# Patient Record
Sex: FEMALE | Race: WHITE
[De-identification: names, ages, dates, MRNs, and addresses within clinical notes are randomized per-mention and may not be internally consistent; named-entity substitution may affect disease eponyms.]

---

## 2022-07-31 ENCOUNTER — HOSPITAL ENCOUNTER (EMERGENCY)
Dept: HOSPITAL 75 - ER FS | Age: 37
Discharge: HOME | End: 2022-07-31
Payer: COMMERCIAL

## 2022-07-31 VITALS — DIASTOLIC BLOOD PRESSURE: 93 MMHG | SYSTOLIC BLOOD PRESSURE: 141 MMHG

## 2022-07-31 DIAGNOSIS — Z28.310: ICD-10-CM

## 2022-07-31 DIAGNOSIS — K81.0: Primary | ICD-10-CM

## 2022-07-31 LAB
ALBUMIN SERPL-MCNC: 4 GM/DL (ref 3.2–4.5)
ALP SERPL-CCNC: 52 U/L (ref 40–136)
ALT SERPL-CCNC: 22 U/L (ref 0–55)
AMYLASE SERPL-CCNC: 51 U/L (ref 25–125)
APTT PPP: YELLOW S
BACTERIA #/AREA URNS HPF: (no result) /HPF
BASOPHILS # BLD AUTO: 0.1 10^3/UL (ref 0–0.1)
BASOPHILS NFR BLD AUTO: 1 % (ref 0–10)
BILIRUB SERPL-MCNC: < 0.2 MG/DL (ref 0.1–1)
BILIRUB UR QL STRIP: NEGATIVE
BUN/CREAT SERPL: 16
CALCIUM SERPL-MCNC: 9.2 MG/DL (ref 8.5–10.1)
CHLORIDE SERPL-SCNC: 105 MMOL/L (ref 98–107)
CO2 SERPL-SCNC: 27 MMOL/L (ref 21–32)
CREAT SERPL-MCNC: 1.05 MG/DL (ref 0.6–1.3)
EOSINOPHIL # BLD AUTO: 0.3 10^3/UL (ref 0–0.3)
EOSINOPHIL NFR BLD AUTO: 2 % (ref 0–10)
EOSINOPHIL NFR BLD MANUAL: 3 %
FIBRINOGEN PPP-MCNC: CLEAR MG/DL
GFR SERPLBLD BASED ON 1.73 SQ M-ARVRAT: 70 ML/MIN
GLUCOSE SERPL-MCNC: 107 MG/DL (ref 70–105)
GLUCOSE UR STRIP-MCNC: NEGATIVE MG/DL
HCT VFR BLD CALC: 43 % (ref 35–52)
HGB BLD-MCNC: 14 G/DL (ref 11.5–16)
KETONES UR QL STRIP: NEGATIVE
LEUKOCYTE ESTERASE UR QL STRIP: NEGATIVE
LIPASE SERPL-CCNC: 29 U/L (ref 8–78)
LYMPHOCYTES # BLD AUTO: 4.2 X 10^3 (ref 1–4)
LYMPHOCYTES NFR BLD AUTO: 28 % (ref 12–44)
MANUAL DIFFERENTIAL PERFORMED BLD QL: YES
MCH RBC QN AUTO: 30 PG (ref 25–34)
MCHC RBC AUTO-ENTMCNC: 33 G/DL (ref 32–36)
MCV RBC AUTO: 92 FL (ref 80–99)
MONOCYTES # BLD AUTO: 1 X 10^3 (ref 0–1)
MONOCYTES NFR BLD AUTO: 7 % (ref 0–12)
MONOCYTES NFR BLD: 2 %
NEUTROPHILS # BLD AUTO: 9.4 X 10^3 (ref 1.8–7.8)
NEUTROPHILS NFR BLD AUTO: 63 % (ref 42–75)
NEUTS BAND NFR BLD MANUAL: 65 %
NITRITE UR QL STRIP: NEGATIVE
PH UR STRIP: 5 [PH] (ref 5–9)
PLATELET # BLD: 347 10^3/UL (ref 130–400)
PMV BLD AUTO: 10.1 FL (ref 9–12.2)
POTASSIUM SERPL-SCNC: 3.9 MMOL/L (ref 3.6–5)
PROT SERPL-MCNC: 7.4 GM/DL (ref 6.4–8.2)
PROT UR QL STRIP: NEGATIVE
RBC #/AREA URNS HPF: (no result) /HPF
SODIUM SERPL-SCNC: 142 MMOL/L (ref 135–145)
SP GR UR STRIP: >=1.03 (ref 1.02–1.02)
SQUAMOUS #/AREA URNS HPF: (no result) /HPF
VARIANT LYMPHS NFR BLD MANUAL: 30 %
WBC # BLD AUTO: 15 10^3/UL (ref 4.3–11)
WBC #/AREA URNS HPF: (no result) /HPF

## 2022-07-31 PROCEDURE — 36415 COLL VENOUS BLD VENIPUNCTURE: CPT

## 2022-07-31 PROCEDURE — 84703 CHORIONIC GONADOTROPIN ASSAY: CPT

## 2022-07-31 PROCEDURE — 81000 URINALYSIS NONAUTO W/SCOPE: CPT

## 2022-07-31 PROCEDURE — 85007 BL SMEAR W/DIFF WBC COUNT: CPT

## 2022-07-31 PROCEDURE — 80053 COMPREHEN METABOLIC PANEL: CPT

## 2022-07-31 PROCEDURE — 74177 CT ABD & PELVIS W/CONTRAST: CPT

## 2022-07-31 PROCEDURE — 83690 ASSAY OF LIPASE: CPT

## 2022-07-31 PROCEDURE — 85027 COMPLETE CBC AUTOMATED: CPT

## 2022-07-31 PROCEDURE — 93005 ELECTROCARDIOGRAM TRACING: CPT

## 2022-07-31 PROCEDURE — 82150 ASSAY OF AMYLASE: CPT

## 2022-07-31 NOTE — ED ABDOMINAL PAIN
General


Chief Complaint:  Abdominal/GI Problems


Stated Complaint:  UPPER ABDOMINAL PAIN


Nursing Triage Note:  


Pt complaining of epigastric pain that started about 3 hours pta


Source of Information:  Patient





History of Present Illness


Date Seen by Provider:  Jul 31, 2022


Time Seen by Provider:  03:27


Initial Comments


36-year-old female with no previous major medical issues presents to ER with 

complaints of epigastric abdominal pain that kind of radiates down and also 

feeling in the back.  Started about 1130 last night.  Patient ate last around 7 

PM last night.  Patient is never had pain like this before.  Patient states is 

not really pain is much as discomfort.  No nausea no vomiting.  Is has had it 

sometimes but then it would go away.  This time is not going away.  Will just 

have kind of a cold sweat.  Pain is going into the back or discomfort is going 

into the back.  She had a shot for her sciatic nerve pain previously.  No change

in bowel movement no change in urination





Allergies and Home Medications


Allergies


Coded Allergies:  


     No Known Drug Allergies (Unverified , 7/31/22)





Patient Home Medication List


Home Medication List Reviewed:  Yes





Review of Systems


Review of Systems


Constitutional:  see HPI





Past Medical-Social-Family Hx


Patient Social History


Tobacco Use?:  No


Use of E-Cig and/or Vaping dev:  No


Substance use?:  No


Alcohol Use?:  No


Pt feels they are or have been:  No





Physical Exam


Vital Signs





Vital Signs - First Documented








 7/31/22





 03:20


 


Temp 36.4


 


Pulse 70


 


Resp 18


 


B/P (MAP) 147/88 (107)


 


Pulse Ox 99


 


O2 Delivery Room Air





Capillary Refill : Less Than 3 Seconds


Height/Weight/BMI


Height: '"


Weight: lbs. oz. kg;  BMI


Method:


General Appearance:  WD/WN, mild distress


HEENT:  PERRL/EOMI, normal ENT inspection, pharynx normal


Neck:  non-tender, supple


Respiratory:  lungs clear, normal breath sounds, no respiratory distress


Cardiovascular:  regular rate, rhythm, no edema, no murmur


Gastrointestinal:  non tender, soft, abnormal bowel sounds; No guarding, No 

rebound, No tenderness


Extremities:  normal range of motion, non-tender, no pedal edema


Neurologic/Psychiatric:  alert, oriented x 3


Skin:  normal color, warm/dry





Progress/Results/Core Measures


Results/Orders


Lab Results





Laboratory Tests








Test


 7/31/22


03:25 7/31/22


03:50 Range/Units


 


 


Urine Color YELLOW    


 


Urine Clarity CLEAR    


 


Urine pH 5.0   5-9  


 


Urine Specific Gravity >=1.030   1.016-1.022  


 


Urine Protein NEGATIVE   NEGATIVE  


 


Urine Glucose (UA) NEGATIVE   NEGATIVE  


 


Urine Ketones NEGATIVE   NEGATIVE  


 


Urine Nitrite NEGATIVE   NEGATIVE  


 


Urine Bilirubin NEGATIVE   NEGATIVE  


 


Urine Urobilinogen 0.2   < = 1.0  MG/DL


 


Urine Leukocyte Esterase NEGATIVE   NEGATIVE  


 


Urine RBC (Auto) 1+ H  NEGATIVE  


 


Urine RBC 2-5 H   /HPF


 


Urine WBC NONE    /HPF


 


Urine Squamous Epithelial


Cells 25-50 H


 


  /HPF





 


Urine Crystals NONE    /LPF


 


Urine Bacteria TRACE    /HPF


 


Urine Casts NONE    /LPF


 


Urine Mucus NEGATIVE    /LPF


 


Urine Culture Indicated NO    


 


White Blood Count


 


 15.0 H


 4.3-11.0


10^3/uL


 


Red Blood Count


 


 4.70 


 3.80-5.11


10^6/uL


 


Hemoglobin  14.0  11.5-16.0  g/dL


 


Hematocrit  43  35-52  %


 


Mean Corpuscular Volume  92  80-99  fL


 


Mean Corpuscular Hemoglobin  30  25-34  pg


 


Mean Corpuscular Hemoglobin


Concent 


 33 


 32-36  g/dL





 


Red Cell Distribution Width  13.4  10.0-14.5  %


 


Platelet Count


 


 347 


 130-400


10^3/uL


 


Mean Platelet Volume  10.1  9.0-12.2  fL


 


Neutrophils (%) (Auto)  63  42-75  %


 


Lymphocytes (%) (Auto)  28  12-44  %


 


Monocytes (%) (Auto)  7  0-12  %


 


Eosinophils (%) (Auto)  2  0-10  %


 


Basophils (%) (Auto)  1  0-10  %


 


Neutrophils # (Auto)  9.4 H 1.8-7.8  X 10^3


 


Lymphocytes # (Auto)  4.2 H 1.0-4.0  X 10^3


 


Monocytes # (Auto)  1.0  0.0-1.0  X 10^3


 


Eosinophils # (Auto)


 


 0.3 


 0.0-0.3


10^3/uL


 


Basophils # (Auto)


 


 0.1 


 0.0-0.1


10^3/uL


 


Neutrophils % (Manual)  65   %


 


Lymphocytes % (Manual)  30   %


 


Monocytes % (Manual)  2   %


 


Eosinophils % (Manual)  3   %


 


Sodium Level  142  135-145  MMOL/L


 


Potassium Level  3.9  3.6-5.0  MMOL/L


 


Chloride Level  105    MMOL/L


 


Carbon Dioxide Level  27  21-32  MMOL/L


 


Anion Gap  10  5-14  MMOL/L


 


Blood Urea Nitrogen  17  7-18  MG/DL


 


Creatinine


 


 1.05 


 0.60-1.30


MG/DL


 


Estimat Glomerular Filtration


Rate 


 70 


  





 


BUN/Creatinine Ratio  16   


 


Glucose Level  107 H   MG/DL


 


Calcium Level  9.2  8.5-10.1  MG/DL


 


Corrected Calcium  9.2  8.5-10.1  MG/DL


 


Total Bilirubin  < 0.2  0.1-1.0  MG/DL


 


Aspartate Amino Transf


(AST/SGOT) 


 14 


 5-34  U/L





 


Alanine Aminotransferase


(ALT/SGPT) 


 22 


 0-55  U/L





 


Alkaline Phosphatase  52    U/L


 


Total Protein  7.4  6.4-8.2  GM/DL


 


Albumin  4.0  3.2-4.5  GM/DL


 


Amylase Level  51    U/L


 


Lipase  29  8-78  U/L


 


Serum Pregnancy Test,


Qualitative 


 NEGATIVE 


 NEGATIVE  











My Orders





Orders - JHONY COLEMAN MD


Comprehensive Metabolic Panel (7/31/22 03:37)


Lipase (7/31/22 03:37)


Amylase (7/31/22 03:37)


Ua Culture If Indicated (7/31/22 03:37)


Hcg,Qualitative Serum (7/31/22 03:37)


Cbc With Automated Diff (7/31/22 03:37)


Ct Abdomen/Pelvis W (7/31/22 03:37)


Ekg Tracing (7/31/22 03:37)


Ed Iv/Invasive Line Start (7/31/22 03:37)


Iohexol Injection (Omnipaque 350 Mg/Ml 1 (7/31/22 03:45)


Received Contrast (Hold Metformin- Contr (7/31/22 03:45)


Ns (Ivpb) (Sodium Chloride 0.9% Ivpb Bag (7/31/22 03:45)


Ondansetron Injection (Zofran Injectio (7/31/22 04:15)


Manual Differential (7/31/22 03:50)


Antacid  Suspension (Mylanta  Suspension (7/31/22 04:45)


Ns Iv 1000 Ml (Sodium Chloride 0.9%) (7/31/22 05:00)


Antacid  Suspension (Mylanta  Suspension (7/31/22 04:46)


Ns Iv 1000 Ml (Sodium Chloride 0.9%) (7/31/22 04:46)


Ketorolac Injection (Toradol Injection) (7/31/22 05:30)





Medications Given in ED





Current Medications








 Medications  Dose


 Ordered  Sig/Cara


 Route  Start Time


 Stop Time Status Last Admin


Dose Admin


 


 Al Hydrox/Mg


 Hydrox/Simethicone  30 ml  ONCE  ONCE


 PO  7/31/22 04:45


 7/31/22 04:48 DC 7/31/22 04:50


30 ML


 


 Iohexol  100 ml  ONCE  ONCE


 IV  7/31/22 03:45


 7/31/22 03:46 DC 7/31/22 04:04


100 ML


 


 Ondansetron HCl  4 mg  ONCE  ONCE


 IVP  7/31/22 04:15


 7/31/22 04:16 DC 7/31/22 04:06


4 MG


 


 Sodium Chloride  100 ml  ONCE  ONCE


 IV  7/31/22 03:45


 7/31/22 03:46 DC 7/31/22 04:04


100 ML








Vital Signs/I&O











 7/31/22





 03:20


 


Temp 36.4


 


Pulse 70


 


Resp 18


 


B/P (MAP) 147/88 (107)


 


Pulse Ox 99


 


O2 Delivery Room Air














Blood Pressure Mean:                    107











Progress


Progress Note :  


   Time:  05:39


Progress Note


Labs and CT return.  Bilirubin and liver enzymes are negative but CT shows acute

possible acute cholecystitis.  Did note cholelithiasis and some mild haziness 

adjacent to the gallbladder neck.  No ductal dilation no fluid around it.  

Patient does have any other adults at home as her  is out of town and 

would like to try the outpatient route.  We will plan to give Levaquin and 

metronidazole.  Patient to have just a clear liquid diet and avoid dairy and 

anything containing fat and call Dr. Sampson's office tomorrow morning


Initial ECG Impression Date:  Jul 31, 2022


Initial ECG Impression Time:  03:50


Initial ECG Rhythm:  Normal Sinus


Initial ECG Intervals:  Normal


CT Read Date:  Jul 31, 2022


CT Read Time:  05:22


CT Results/Progress Notes


Impression was cholelithiasis and mild haziness adjacent to the gallbladder neck

findings suggest acute cholecystitis mild haziness surrounding the bladder 

clinical correlation for cystitis recommended no other acute abnormality 

identified





Departure


Communication (Admissions)


Time/Spoke to Consulting Phy:  05:30


Spoke with Dr. Sampson at [facility] who recommended either 

antibiotics clear liquid diet and follow-up as outpatient or transfer for 

admission and IV antibiotics and cholecystectomy.





Impression





   Primary Impression:  


   Acute cholecystitis


Disposition:  01 HOME, SELF-CARE


Condition:  Stable





Departure-Patient Inst.


Referrals:  


NO,LOCAL PHYSICIAN (PCP/Family)


Primary Care Physician


Patient Instructions:  Gallstones (DC), Gallbladder Diet, Cholecystectomy (DC)





Add. Discharge Instructions:  


Take medication as prescribed.  Ibuprofen for pain.  May take 400 to 600 mg 

every 6-8 hours as needed for pain.  Call Dr. Sampson's office in the morning at 

[phone] and let them know you were in the ER and Dr. Sampson was consulted. 

If pain becomes worse or if you start to have nausea vomiting fever return to ER





All discharge instructions reviewed with patient and/or family. Voiced understan

ding.


Scripts


Metronidazole (Metronidazole) 500 Mg Tablet


500 MG PO TID for 7 Days, #21 TAB 0 Refills


   Prov: JHONY COLEMAN MD         7/31/22 


Levofloxacin (Levofloxacin) 750 Mg Tablet


750 MG PO DAILY for 7 Days, #7 TAB 0 Refills


   Prov: JHONY COLEMAN MD         7/31/22











JHONY COLEMAN MD         Jul 31, 2022 03:40

## 2022-07-31 NOTE — DIAGNOSTIC IMAGING REPORT
EXAMINATION: CT abdomen and pelvis with intravenous contrast.



TECHNIQUE: Multiple contiguous axial images were obtained through

the abdomen and pelvis after the uneventful administration of

intravenous contrast. All CT scans use one or more of the

following dose optimizing techniques: automated exposure control,

MA and/or KvP adjustment based on patient size and exam type or

iterative reconstruction. 



HISTORY: abdominal pain epigastric with radiation



COMPARISON: None available.



FINDINGS: 



Lung bases: The lung bases are clear.



Solid organs: The liver is normal without focal lesion. Multiple

layering hyperdense stones within the gallbladder. Minimal fat

stranding adjacent to the gallbladder neck. There is no biliary

ductal dilation. Pancreas is normal.  Spleen is normal. Adrenal

glands are normal. The kidneys are normal without hydronephrosis.



Bowel: The stomach and small bowel are normal without

obstruction. The colon and appendix are normal. 



Peritoneum: There is no intraperitoneal free fluid or free air.

No suspicious lymphadenopathy. 



Vasculature: Normal without aneurysm.



Musculoskeletal: No suspicious osseous lesion or compression

fracture.



Pelvis: The uterus and adnexa are normal. Mild bladder wall

thickening.



IMPRESSION:



1. Cholelithiasis with minimal stranding adjacent to the neck

which can be seen with acute cholecystitis in the appropriate

clinical setting. Consider correlation with gallbladder

ultrasound.

2. Mild bladder wall thickening. Recommend correlation with

urinalysis.

3. Agree with preliminary interpretation.



Dictated by: 



  Dictated on workstation # [id number]

## 2022-08-25 ENCOUNTER — HOSPITAL ENCOUNTER (OUTPATIENT)
Dept: HOSPITAL 75 - PREOP | Age: 37
End: 2022-08-25
Attending: SURGERY
Payer: COMMERCIAL

## 2022-08-25 VITALS — HEIGHT: 67.01 IN | WEIGHT: 242.07 LBS | BODY MASS INDEX: 37.99 KG/M2

## 2022-08-25 DIAGNOSIS — K80.20: ICD-10-CM

## 2022-08-25 DIAGNOSIS — Z01.818: Primary | ICD-10-CM

## 2022-09-01 ENCOUNTER — HOSPITAL ENCOUNTER (OUTPATIENT)
Dept: HOSPITAL 75 - SDC | Age: 37
Discharge: HOME | End: 2022-09-01
Attending: SURGERY
Payer: COMMERCIAL

## 2022-09-01 VITALS — SYSTOLIC BLOOD PRESSURE: 130 MMHG | DIASTOLIC BLOOD PRESSURE: 77 MMHG

## 2022-09-01 VITALS — SYSTOLIC BLOOD PRESSURE: 141 MMHG | DIASTOLIC BLOOD PRESSURE: 94 MMHG

## 2022-09-01 VITALS — SYSTOLIC BLOOD PRESSURE: 109 MMHG | DIASTOLIC BLOOD PRESSURE: 69 MMHG

## 2022-09-01 VITALS — DIASTOLIC BLOOD PRESSURE: 81 MMHG | SYSTOLIC BLOOD PRESSURE: 127 MMHG

## 2022-09-01 VITALS — DIASTOLIC BLOOD PRESSURE: 82 MMHG | SYSTOLIC BLOOD PRESSURE: 126 MMHG

## 2022-09-01 VITALS — DIASTOLIC BLOOD PRESSURE: 72 MMHG | SYSTOLIC BLOOD PRESSURE: 114 MMHG

## 2022-09-01 VITALS — BODY MASS INDEX: 37.99 KG/M2 | WEIGHT: 242.07 LBS | HEIGHT: 66.93 IN

## 2022-09-01 VITALS — SYSTOLIC BLOOD PRESSURE: 136 MMHG | DIASTOLIC BLOOD PRESSURE: 87 MMHG

## 2022-09-01 VITALS — SYSTOLIC BLOOD PRESSURE: 127 MMHG | DIASTOLIC BLOOD PRESSURE: 81 MMHG

## 2022-09-01 VITALS — DIASTOLIC BLOOD PRESSURE: 80 MMHG | SYSTOLIC BLOOD PRESSURE: 133 MMHG

## 2022-09-01 VITALS — SYSTOLIC BLOOD PRESSURE: 110 MMHG | DIASTOLIC BLOOD PRESSURE: 72 MMHG

## 2022-09-01 VITALS — DIASTOLIC BLOOD PRESSURE: 74 MMHG | SYSTOLIC BLOOD PRESSURE: 131 MMHG

## 2022-09-01 DIAGNOSIS — Z28.310: ICD-10-CM

## 2022-09-01 DIAGNOSIS — E66.9: ICD-10-CM

## 2022-09-01 DIAGNOSIS — K21.9: ICD-10-CM

## 2022-09-01 DIAGNOSIS — K80.10: Primary | ICD-10-CM

## 2022-09-01 PROCEDURE — 88304 TISSUE EXAM BY PATHOLOGIST: CPT

## 2022-09-01 PROCEDURE — 84703 CHORIONIC GONADOTROPIN ASSAY: CPT

## 2022-09-01 PROCEDURE — 87081 CULTURE SCREEN ONLY: CPT

## 2022-09-01 PROCEDURE — 76000 FLUOROSCOPY <1 HR PHYS/QHP: CPT

## 2022-09-01 NOTE — DIAGNOSTIC IMAGING REPORT
INDICATION:  Epigastric abdominal pain, with cholelithiasis

undergoing laparoscopic cholecystectomy.



FINDINGS: A single static along with a single series

intraoperative cholangiogram images are submitted. There is

cannulation of the extra hepatic biliary tree. Images demonstrate

contrast opacification of the biliary system. Biliary tree is not

significantly dilated. There was no persistent filling defect to

indicate a retained stone. Flow was present into the duodenum.



Fluoroscopic time: 11 seconds



IMPRESSION: Negative laparoscopic cholangiogram.





Dictated by: 



  Dictated on workstation # [id number]

## 2022-09-01 NOTE — PROGRESS NOTE-POST OPERATIVE
Post-Operative Progess Note


Surgeon (s)/Assistant (s)


Surgeon


HARSHAD LOPEZ DO


Assistant:  Dr. Leon to assist in retraction dissection and closure.





Pre-Operative Diagnosis


gallstones, ruq abd pain





Post-Operative Diagnosis





same





Procedure & Operative Findings


Date of Procedure


9/1/22


Procedure Performed/Findings


  


PROCEDURE:


Laparoscopic cholecystectomy with intraoperative


cholangiogram. 


 


COMPLICATIONS:


None. 


 


PROCEDURE:


The patient was taken to the operating suite and was prepped and


draped in sterile fashion. A surgical pause was performed. Just


superior to the umbilicus, a 12 mm incision was made. Dissection


was taken down to the fascia, which was then scored and grasped


with a Kocher and the abdomen was then entered.  A 0 Vicryl


suture was placed in a figure-of-eight fashion and a Su


trocar was placed and secured. Pneumoperitoneum was achieved.


A 5mm trochar place in the subxyphoid and 2 in the right upper quadrant.


The gallbladder was then grasped and elevated. The


cystic duct, and cystic artery were then 


dissected out. Clip was placed on the distal


portion of the cystic duct which was then partially transected.


An arrow catheter was inserted into the duct. 


The cholangiogram was then performed. No filing defects and contrast


made its way into the duodenum.  Catheter removed. Clips were placed


on proximal portion of the cystic duct and then the duct was then


transected. Clips were placed along the proximal and distal


portion of the cystic artery which was then transected. Hook


cautery was used to dissect the gallbladder from the gallbladder


fossa achieving hemostasis. The gallbladder was placed in an


Endobag and removed through the 12 mm trocar site. The abdomen


was then reinspected.  Copious amounts of irrigation were used to


irrigate the abdomen and there were no signs of active bleeding.


Hemostasis had been achieved. The 12 mm fascial defect was then


closed with 0 Vicryl suture that had been placed in a


figure-of-eight fashion. The abdomen was then desufflated, the


trocars were removed. The abdomen was then washed and dried. The


skin was then closed using 4-0 Monocryl in a subcuticular fashion.


The abdomen was washed and dried and Skin Affix was place over incisions.


Patient tolerated the procedure well without any complications 


and was taken to the recovery room in stable condition.


Anesthesia Type


general





Estimated Blood Loss


Estimated blood loss (mL):  minimal





Specimens/Packing


Specimens Removed


gallbladder











HARSHAD LOPEZ DO               Sep 1, 2022 10:31

## 2022-09-01 NOTE — PROGRESS NOTE-PRE OPERATIVE
Pre-Operative Progress Note


Date of Available H&P:  Aug 8, 2022


Date H&P Reviewed:  Sep 1, 2022


Time H&P Reviewed:  08:14


History & Physical:  H&P Reviewed, Patient Examed, No changes noted


Pre-Operative Diagnosis:  gallstones, ruq abd pain











HARSHAD LOPEZ DO               Sep 1, 2022 08:14

## 2022-09-01 NOTE — DISCHARGE INST-SIMPLE/STANDARD
Discharge Inst-Standard


Discharge Medications


New, Converted or Re-Newed RX:  Transmitted to Pharmacy





Patient Instructions/Follow Up


Plan of Care/Instructions/FU:  


2 weeks Camilla


Activity as Tolerated:  No


Discharge Diet:  Regular Diet


Other Inst to Patient


Follow up Appt:


Make appointment for 2 week.





Instructions:


No lifting greater than 10 pounds.


No strenuous activity. 


May shower in 24 hours, no tub bath or soaking.


Use incentive spirometer at home as directed.


No Smoking





Skin/Wound Care:


You have special glue over your incision that will fall off on it's own. 





Symptoms to Report:


Appetite Changes, Extremity Discoloration, Numbness/Tingling, Swelling 

Increased, Bleeding Excessive, Eyesight Changes, Pain Increased, Urine Color 

Change, Constipation(Persistent), Fever over 101 degree F, Pain/Pressure in 

chest, Urinating Difficulty, Cough Up/Vomit Blood, Heart Beat Irreg/Pounding, 

Pain/Pressure in jaw, Vaginal Bleeding Increase, Cramps in feet or legs, 

Lightheadedness, Pain/Pressure in shoulder, Diarrhea(Persistent), Memory Changes

Suddenly, Questions/Concerns, Weight gain consecutive days, Dizziness/Fainting, 

Nausea/Vomiting, Shortness of Breath, Weight gain over 2 pounds








If questions or concerns contact your physician 


Or seek help at emergency department.











HARSHAD LOPEZ DO               Sep 1, 2022 10:35

## 2022-09-01 NOTE — ANESTHESIA-GENERAL POST-OP
General


Patient Condition


Mental Status/LOC:  Same as Preop


Cardiovascular:  Satisfactory


Nausea/Vomiting:  Absent


Respiratory:  Satisfactory


Pain:  Controlled


Complications:  Absent





Post Op Complications


Complications


None





Follow Up Care/Instructions


Patient Instructions


None needed.





Anesthesia/Patient Condition


Patient Condition


Patient was doing well after the procedure, no complaints, stable vital signs, 

no apparent adverse anesthesia problems.   


No complications reported per nursing.











BHAVESH VILA DO          Sep 1, 2022 14:12

## 2023-09-19 ENCOUNTER — HOSPITAL ENCOUNTER (OUTPATIENT)
Dept: HOSPITAL 75 - RAD | Age: 38
End: 2023-09-19
Attending: OBSTETRICS & GYNECOLOGY
Payer: COMMERCIAL

## 2023-09-19 DIAGNOSIS — N85.2: ICD-10-CM

## 2023-09-19 DIAGNOSIS — Z12.31: Primary | ICD-10-CM

## 2023-09-19 DIAGNOSIS — N63.10: ICD-10-CM

## 2023-09-19 PROCEDURE — 77067 SCR MAMMO BI INCL CAD: CPT

## 2023-09-19 PROCEDURE — 76856 US EXAM PELVIC COMPLETE: CPT

## 2023-09-19 PROCEDURE — 77063 BREAST TOMOSYNTHESIS BI: CPT

## 2023-09-19 PROCEDURE — 76830 TRANSVAGINAL US NON-OB: CPT

## 2023-09-19 NOTE — DIAGNOSTIC IMAGING REPORT
INDICATION: 

Routine screening.



COMPARISON: 

No prior mammograms are available for comparison. This is a

baseline study.



TECHNIQUE: 

2D and 3D bilateral screening mammography was performed with CAD.



FINDINGS:

Both breasts are heterogeneously dense, limiting the sensitivity

of mammography. There is a circumscribed nodule in the outer

aspect of the right breast. This appears to be slightly superior

on the MLO view. This likely represents an intraparenchymal lymph

node but additional views are recommended. The left breast is

unremarkable. No malignant-appearing microcalcifications are

seen. The axillae are unremarkable.



IMPRESSION: 

Right breast density. Additional views are recommended for

further evaluation.



ACR BI-RADS Category 0: Incomplete. (Needs additional imaging

evaluation).

Result letter will be mailed to the patient.

Note: At least 10% of breast cancer is not imaged by mammography.



Dictated by: 



  Dictated on workstation # [id number]

## 2023-09-19 NOTE — DIAGNOSTIC IMAGING REPORT
PROCEDURE: 

Pelvic comp/transvaginal sonogram.



TECHNIQUE: 

Complete transabdominal and transvaginal pelvic ultrasound was

performed. In addition, limited pelvic Doppler was performed.



INDICATION:  

Enlarged uterus.



FINDINGS:

The uterus is retroverted measuring 7.6 x 4.9 x 6.2 cm. The

endometrium is abnormally thickened at 2.4 cm. No myometrial mass

is detected. The right ovary measures 2.2 x 2.0 x 1.5 cm and the

left ovary measures 2.5 x 2.0 x 1.8 cm. Both ovaries demonstrate

blood flow. No adnexal mass or free fluid is detected.



IMPRESSION: 

Significantly thickened endometrium of 2.4 cm. While this could

be owing to hyperplasia, neoplasm cannot be entirely excluded.

The study is otherwise unremarkable.







Dictated by: 



  Dictated on workstation # [id number]

## 2023-10-05 ENCOUNTER — HOSPITAL ENCOUNTER (OUTPATIENT)
Dept: HOSPITAL 75 - RAD | Age: 38
End: 2023-10-05
Attending: OBSTETRICS & GYNECOLOGY
Payer: COMMERCIAL

## 2023-10-05 DIAGNOSIS — R92.8: Primary | ICD-10-CM

## 2023-10-05 PROCEDURE — 76642 ULTRASOUND BREAST LIMITED: CPT

## 2023-10-05 PROCEDURE — 77065 DX MAMMO INCL CAD UNI: CPT

## 2023-10-05 NOTE — DIAGNOSTIC IMAGING REPORT
INDICATION: Right breast density. Patient presents for additional

views.



Correlation is made with prior screening mammogram from

09/19/2023.



Unilateral right 2-D and 3-D diagnostic mammography was performed

with CAD. This included spot compression CC and ML views as well

as conventional 90 degrees lateral views.



Additional views show persistent circumscribed lobulated density

in the lower outer right breast mid depth. This has the

appearance of an intraparenchymal lymph node. No other masses are

seen.



IMPRESSION:  Probable interparenchymal lymph node of the lower

outer right breast mid depth. Further evaluation with ultrasound

is recommended and will be performed today.



ACR BI-RADS Category 0: Incomplete. (Needs additional imaging

evaluation).

Result letter will be mailed to the patient.

Note: At least 10% of breast cancer is not imaged by mammography.



BI-RADS Category 0



Dictated by: 



  Dictated on workstation # [id number]

## 2023-10-05 NOTE — DIAGNOSTIC IMAGING REPORT
INDICATION: Abnormal mammogram. Study is performed for further

evaluation.



Correlation is made with diagnostic mammogram earlier the same

day as well as screening mammogram from 09/19/2023.



Sonographic interrogation of the lower outer right breast was

performed. There is a hypoechoic solid lobulated nodule at the

8:30 location right breast, 5-6 image from the nipple measuring 4

x 3 x 5 mm. This does have an echogenic hilum and is most

consistent with intraparenchymal lymph node. This likely accounts

for the mammographic density. No other abnormalities are

identified.



IMPRESSION:  Intraperitoneal lymph node 8:30 location right

breast corresponding to the mammographic density. Patient may

return to routine annual screening mammography.



ACR BI-RADS Category 2: Benign findings.

Result letter will be mailed to the patient.

Note: At least 10% of breast cancer is not imaged by mammography.



BI-RADS Category 2



Dictated by: 



  Dictated on workstation # [id number]